# Patient Record
Sex: FEMALE | Race: WHITE | URBAN - METROPOLITAN AREA
[De-identification: names, ages, dates, MRNs, and addresses within clinical notes are randomized per-mention and may not be internally consistent; named-entity substitution may affect disease eponyms.]

---

## 2024-02-28 ENCOUNTER — TRANSCRIBE ORDERS (OUTPATIENT)
Facility: HOSPITAL | Age: 64
End: 2024-02-28

## 2024-02-28 DIAGNOSIS — D50.9 IRON DEFICIENCY ANEMIA, UNSPECIFIED IRON DEFICIENCY ANEMIA TYPE: ICD-10-CM

## 2024-02-28 DIAGNOSIS — K21.9 GASTROESOPHAGEAL REFLUX DISEASE, UNSPECIFIED WHETHER ESOPHAGITIS PRESENT: Primary | ICD-10-CM

## 2024-02-28 DIAGNOSIS — R13.14 DYSPHAGIA, PHARYNGOESOPHAGEAL PHASE: ICD-10-CM

## 2024-02-28 DIAGNOSIS — M48.02 CERVICAL SPINAL STENOSIS: ICD-10-CM

## 2024-03-26 ENCOUNTER — HOSPITAL ENCOUNTER (OUTPATIENT)
Facility: HOSPITAL | Age: 64
Discharge: HOME | End: 2024-03-29
Payer: MEDICARE

## 2024-03-26 DIAGNOSIS — D50.9 IRON DEFICIENCY ANEMIA, UNSPECIFIED IRON DEFICIENCY ANEMIA TYPE: ICD-10-CM

## 2024-03-26 DIAGNOSIS — R13.14 DYSPHAGIA, PHARYNGOESOPHAGEAL PHASE: ICD-10-CM

## 2024-03-26 DIAGNOSIS — K21.9 GASTROESOPHAGEAL REFLUX DISEASE, UNSPECIFIED WHETHER ESOPHAGITIS PRESENT: ICD-10-CM

## 2024-03-26 DIAGNOSIS — M48.02 CERVICAL SPINAL STENOSIS: ICD-10-CM

## 2024-03-26 PROCEDURE — 92611 MOTION FLUOROSCOPY/SWALLOW: CPT

## 2024-03-26 PROCEDURE — 74230 X-RAY XM SWLNG FUNCJ C+: CPT

## 2024-03-26 NOTE — PROGRESS NOTES
[facility]  SPEECH PATHOLOGY MODIFIED BARIUM SWALLOW STUDY  Patient: Zoila Kelly (63 y.o. female)  Date: 3/26/2024  Referring Provider:  Naman GALE:   Patient's sister and SLP attended [facility] with her.  Patient has CP.  SLP reports that patient was recently downgraded from S&BS diet to   Minced and Moist diet. She is still on thin liquids. She has coughing at meals with known GI issues/reflux.   She was alert and cooperative in this study. She had coughing both before and after the study when reclined for transfers.     OBJECTIVE:   Past Medical History:   No past medical history on file.No past surgical history on file.  Current Dietary Status:  Minced and moist, thin liquids.   Type of Study: Modified barium swallow  Film Views: Lateral  Radiologist: Herber  Patient Position: upright in Hausted chair. SHe was fairly kyphotic and needed cushion to hold her upright      Trial 1: Trial 2:   Consistencies Administered:  (thin liquids by straw, pudding, cracker chunks in pudding, small piece of cracker)     How Presented: SLP-fed/Presented;Spoon;Straw;Successive Swallows  ORAL PHASE:      Bolus Acceptance: Impaired (reduced closure on spoon, used teeth to scrape material off spoon). Good use of straw.      Bolus Formation/Control: Impaired (slower a-p propulsion, used a mostly vertical chew instead of rotary)       Propulsion: Delayed (# of seconds)       Oral Residue: None      PHARYNGEAL PHASE:     Timing: Pooling 1-5 sec     Penetration: Trace;Before swallow;From initial swallow (once when she drank after the bite of cracker. THis may indicate that she has difficulty transiting from consistency that allows a slower swallow respone than one that requires a faster response.)     Aspiration/Timing:  (avoided b/c she independently  produced at throat clear reaction that cleared penetration)     Pharyngeal Clearance: No residue                   Trial 3: Trial 4:

## 2024-03-28 ENCOUNTER — HOSPITAL ENCOUNTER (OUTPATIENT)
Facility: HOSPITAL | Age: 64
Discharge: HOME | End: 2024-03-28
Payer: MEDICARE

## 2024-03-28 DIAGNOSIS — R13.10 DYSPHAGIA, UNSPECIFIED TYPE: ICD-10-CM

## 2024-03-28 DIAGNOSIS — R13.14 DYSPHAGIA, PHARYNGOESOPHAGEAL PHASE: ICD-10-CM

## 2024-03-28 DIAGNOSIS — M48.02 CERVICAL SPINAL STENOSIS: ICD-10-CM

## 2024-03-28 DIAGNOSIS — K21.9 CHRONIC GERD: ICD-10-CM

## 2024-03-28 DIAGNOSIS — D50.9 IRON DEFICIENCY ANEMIA, UNSPECIFIED IRON DEFICIENCY ANEMIA TYPE: ICD-10-CM

## 2024-03-28 DIAGNOSIS — K21.9 GASTROESOPHAGEAL REFLUX DISEASE, UNSPECIFIED WHETHER ESOPHAGITIS PRESENT: ICD-10-CM

## 2024-03-28 PROCEDURE — 71045 X-RAY EXAM CHEST 1 VIEW: CPT

## 2024-04-12 ENCOUNTER — HOSPITAL ENCOUNTER (OUTPATIENT)
Facility: HOSPITAL | Age: 64
End: 2024-04-12
Payer: MEDICARE

## 2024-04-12 PROCEDURE — 74220 X-RAY XM ESOPHAGUS 1CNTRST: CPT

## 2024-04-12 PROCEDURE — 74250 X-RAY XM SM INT 1CNTRST STD: CPT
